# Patient Record
Sex: FEMALE | Race: WHITE | NOT HISPANIC OR LATINO | Employment: FULL TIME | URBAN - METROPOLITAN AREA
[De-identification: names, ages, dates, MRNs, and addresses within clinical notes are randomized per-mention and may not be internally consistent; named-entity substitution may affect disease eponyms.]

---

## 2020-08-21 ENCOUNTER — OFFICE VISIT (OUTPATIENT)
Dept: URGENT CARE | Facility: CLINIC | Age: 40
End: 2020-08-21
Payer: COMMERCIAL

## 2020-08-21 VITALS
SYSTOLIC BLOOD PRESSURE: 122 MMHG | WEIGHT: 138 LBS | RESPIRATION RATE: 12 BRPM | TEMPERATURE: 98.4 F | HEIGHT: 66 IN | OXYGEN SATURATION: 98 % | BODY MASS INDEX: 22.18 KG/M2 | HEART RATE: 98 BPM | DIASTOLIC BLOOD PRESSURE: 74 MMHG

## 2020-08-21 DIAGNOSIS — S61.012A LACERATION OF LEFT THUMB WITHOUT FOREIGN BODY WITHOUT DAMAGE TO NAIL, INITIAL ENCOUNTER: ICD-10-CM

## 2020-08-21 PROCEDURE — 12002 RPR S/N/AX/GEN/TRNK2.6-7.5CM: CPT | Mod: FA | Performed by: PHYSICIAN ASSISTANT

## 2020-08-21 RX ORDER — ESTRADIOL VALERATE 40 MG/ML
INJECTION INTRAMUSCULAR
Status: ON HOLD | COMMUNITY
Start: 2020-08-03 | End: 2021-03-16

## 2020-08-21 RX ORDER — PROGESTERONE 90 MG/1.125G
GEL VAGINAL
Status: ON HOLD | COMMUNITY
Start: 2020-06-18 | End: 2021-03-16

## 2020-08-21 RX ORDER — PROGESTERONE 100 MG/1
INSERT VAGINAL
Status: ON HOLD | COMMUNITY
Start: 2020-08-17 | End: 2021-03-16

## 2020-08-21 RX ORDER — ASPIRIN 81 MG/1
TABLET, COATED ORAL
Status: ON HOLD | COMMUNITY
Start: 2020-07-20 | End: 2021-03-16

## 2020-08-21 RX ORDER — LEUPROLIDE ACETATE 1 MG/0.2ML
KIT SUBCUTANEOUS
Status: ON HOLD | COMMUNITY
Start: 2020-05-27 | End: 2021-03-16

## 2020-08-21 RX ORDER — PROGESTERONE 50 MG/ML
INJECTION, SOLUTION INTRAMUSCULAR
Status: ON HOLD | COMMUNITY
Start: 2020-08-17 | End: 2021-03-16

## 2020-08-21 ASSESSMENT — ENCOUNTER SYMPTOMS
DIZZINESS: 0
FEVER: 0
SHORTNESS OF BREATH: 0
MUSCULOSKELETAL NEGATIVE: 1
NAUSEA: 0
EYE DISCHARGE: 0
VOMITING: 0
EYE REDNESS: 0
CHILLS: 0
ABDOMINAL PAIN: 0
ROS SKIN COMMENTS: + LACERATION
DIARRHEA: 0

## 2020-08-21 NOTE — PROGRESS NOTES
"Subjective:      Tiffanie Shaikh is a 39 y.o. female who presents with Laceration (LEFT thumb after attempting to catch a plate last night )            Laceration   The incident occurred 12 to 24 hours ago. The laceration is located on the left hand. The laceration is 3 cm in size. The pain is at a severity of 2/10. The pain is mild. The pain has been constant since onset. She reports no foreign bodies present. Her tetanus status is UTD.     Patient presents to urgent care reporting a laceration on her left thumb that occurred last night when she attempted to catch a ceramic plate that fell. She is right hand dominant. No significant pain. She denies distal numbness/tingling. Tdap is UTD (2017).    Review of Systems   Constitutional: Negative for chills and fever.   HENT: Negative for congestion.    Eyes: Negative for discharge and redness.   Respiratory: Negative for shortness of breath.    Cardiovascular: Negative for chest pain.   Gastrointestinal: Negative for abdominal pain, diarrhea, nausea and vomiting.   Genitourinary: Negative.    Musculoskeletal: Negative.    Skin: Negative for rash.        + laceration   Neurological: Negative for dizziness.        Objective:     /74   Pulse 98   Temp 36.9 °C (98.4 °F) (Temporal)   Resp 12   Ht 1.676 m (5' 6\")   Wt 62.6 kg (138 lb)   LMP 06/22/2020 (Exact Date)   SpO2 98%   Breastfeeding No   BMI 22.27 kg/m²        Physical Exam  Vitals signs and nursing note reviewed.   Constitutional:       General: She is not in acute distress.     Appearance: Normal appearance. She is well-developed. She is not diaphoretic.   HENT:      Head: Normocephalic and atraumatic.      Right Ear: External ear normal.      Left Ear: External ear normal.      Nose: Nose normal.   Eyes:      Pupils: Pupils are equal, round, and reactive to light.   Neck:      Musculoskeletal: Normal range of motion.   Cardiovascular:      Rate and Rhythm: Normal rate.   Pulmonary:      Effort: " Pulmonary effort is normal.   Musculoskeletal: Normal range of motion.        Hands:       Comments: Jagged laceration present on radial aspect of left thumb, measuring approximately 4 cm in total length. Minimal active bleeding without foreign bodies noted. Distally n/v intact.    Skin:     General: Skin is warm and dry.   Neurological:      Mental Status: She is alert and oriented to person, place, and time.   Psychiatric:         Behavior: Behavior normal.          PMH:  has no past medical history on file.  MEDS:   Current Outpatient Medications:   •  ASPIRIN LOW DOSE 81 MG EC tablet, , Disp: , Rfl:   •  estradiol valerate (DELESTROGEN) 40 MG/ML Oil, , Disp: , Rfl:   •  progesterone 50 MG/ML injection, , Disp: , Rfl:   •  ENDOMETRIN 100 MG vaginal insert, , Disp: , Rfl:   •  CRINONE 8 % Gel gel, , Disp: , Rfl:   •  Leuprolide Acetate 1 MG/0.2ML Kit, , Disp: , Rfl:   ALLERGIES: No Known Allergies  SURGHX:   Past Surgical History:   Procedure Laterality Date   • TONSILLECTOMY       SOCHX:  reports that she has never smoked. She has never used smokeless tobacco. She reports previous alcohol use. She reports that she does not use drugs.  FH: family history is not on file.       Assessment/Plan:        1. Laceration of left thumb without foreign body without damage to nail, initial encounter    Procedure: Laceration Repair  -Risks including bleeding, nerve damage, infection, and poor cosmetic outcome discussed. Benefits and alternatives discussed.   -Clean technique with sterile instruments and suture used  -Local anesthesia with 2% lidocaine without epi  -Closed with 7 # 4-0 Nylon interrupted sutures with good wound approximation  -Polysporin and dressing placed  -Patient tolerated well    Wound care discussed at length. RTC in 7-10 days for suture removal, or sooner if signs of infection develop. The patient demonstrated a good understanding and agreed with the treatment plan.

## 2020-08-29 ENCOUNTER — OFFICE VISIT (OUTPATIENT)
Dept: URGENT CARE | Facility: CLINIC | Age: 40
End: 2020-08-29
Payer: COMMERCIAL

## 2020-08-29 VITALS
OXYGEN SATURATION: 97 % | HEIGHT: 66 IN | DIASTOLIC BLOOD PRESSURE: 82 MMHG | WEIGHT: 137.2 LBS | SYSTOLIC BLOOD PRESSURE: 125 MMHG | HEART RATE: 77 BPM | BODY MASS INDEX: 22.05 KG/M2 | TEMPERATURE: 98 F

## 2020-08-29 DIAGNOSIS — Z48.02 VISIT FOR SUTURE REMOVAL: ICD-10-CM

## 2020-08-29 DIAGNOSIS — S61.012D LACERATION OF LEFT THUMB, SUBSEQUENT ENCOUNTER: ICD-10-CM

## 2020-08-29 PROCEDURE — 99212 OFFICE O/P EST SF 10 MIN: CPT | Performed by: FAMILY MEDICINE

## 2020-08-29 NOTE — PROGRESS NOTES
HPI:   Presents for suture removal 8 days post procedure. L thumb aceration without pain, discharge or redness.     ROS:   no fever, no sensory loss, no weakness    Exam:   Gen: WDWN in no distress  Wound: well healing laceration. 7 interrupted sutures removed without difficulty. No evidence of dehiscence or infection.  Neuro: sensory/motor intact     A/P   Laceration  Suture Removal  F/U prn

## 2020-11-18 LAB
HBV SURFACE AG SERPL QL IA: NEGATIVE
HIV 1+2 AB+HIV1 P24 AG SERPL QL IA: NORMAL
RUBV IGG SERPL IA-ACNC: NORMAL

## 2020-12-18 LAB — TREPONEMA PALLIDUM IGG+IGM AB [PRESENCE] IN SERUM OR PLASMA BY IMMUNOASSAY: NORMAL

## 2021-01-25 LAB
ABO GROUP BLD: NORMAL
RH BLD: NORMAL

## 2021-02-25 LAB
STREP GP B DNA PCR: POSITIVE

## 2021-03-12 ENCOUNTER — HOSPITAL ENCOUNTER (OUTPATIENT)
Dept: OBGYN | Facility: MEDICAL CENTER | Age: 41
End: 2021-03-12
Attending: OBSTETRICS & GYNECOLOGY
Payer: COMMERCIAL

## 2021-03-12 LAB
SARS-COV-2 RNA RESP QL NAA+PROBE: NOTDETECTED
SPECIMEN SOURCE: NORMAL

## 2021-03-12 PROCEDURE — U0005 INFEC AGEN DETEC AMPLI PROBE: HCPCS

## 2021-03-12 PROCEDURE — U0003 INFECTIOUS AGENT DETECTION BY NUCLEIC ACID (DNA OR RNA); SEVERE ACUTE RESPIRATORY SYNDROME CORONAVIRUS 2 (SARS-COV-2) (CORONAVIRUS DISEASE [COVID-19]), AMPLIFIED PROBE TECHNIQUE, MAKING USE OF HIGH THROUGHPUT TECHNOLOGIES AS DESCRIBED BY CMS-2020-01-R: HCPCS

## 2021-03-12 NOTE — PROGRESS NOTES
Pt here for covid screen; test collected; tolerated well; given self isolating discharge instructions, verbalizes understanding; discharged to home.  
58.1

## 2021-03-15 ENCOUNTER — APPOINTMENT (OUTPATIENT)
Dept: OBGYN | Facility: MEDICAL CENTER | Age: 41
End: 2021-03-15
Attending: OBSTETRICS & GYNECOLOGY
Payer: COMMERCIAL

## 2021-03-15 ENCOUNTER — ANESTHESIA (OUTPATIENT)
Dept: ANESTHESIOLOGY | Facility: MEDICAL CENTER | Age: 41
End: 2021-03-15
Payer: COMMERCIAL

## 2021-03-15 ENCOUNTER — ANESTHESIA EVENT (OUTPATIENT)
Dept: ANESTHESIOLOGY | Facility: MEDICAL CENTER | Age: 41
End: 2021-03-15
Payer: COMMERCIAL

## 2021-03-15 ENCOUNTER — HOSPITAL ENCOUNTER (INPATIENT)
Facility: MEDICAL CENTER | Age: 41
LOS: 2 days | End: 2021-03-17
Attending: OBSTETRICS & GYNECOLOGY | Admitting: OBSTETRICS & GYNECOLOGY
Payer: COMMERCIAL

## 2021-03-15 LAB
BASOPHILS # BLD AUTO: 0.3 % (ref 0–1.8)
BASOPHILS # BLD: 0.03 K/UL (ref 0–0.12)
EOSINOPHIL # BLD AUTO: 0.07 K/UL (ref 0–0.51)
EOSINOPHIL NFR BLD: 0.8 % (ref 0–6.9)
ERYTHROCYTE [DISTWIDTH] IN BLOOD BY AUTOMATED COUNT: 48.8 FL (ref 35.9–50)
GLUCOSE BLD-MCNC: 69 MG/DL (ref 65–99)
GLUCOSE BLD-MCNC: 79 MG/DL (ref 65–99)
GLUCOSE BLD-MCNC: 99 MG/DL (ref 65–99)
HCT VFR BLD AUTO: 40.7 % (ref 37–47)
HGB BLD-MCNC: 13.9 G/DL (ref 12–16)
HOLDING TUBE BB 8507: NORMAL
IMM GRANULOCYTES # BLD AUTO: 0.04 K/UL (ref 0–0.11)
IMM GRANULOCYTES NFR BLD AUTO: 0.4 % (ref 0–0.9)
LYMPHOCYTES # BLD AUTO: 2.88 K/UL (ref 1–4.8)
LYMPHOCYTES NFR BLD: 31.2 % (ref 22–41)
MCH RBC QN AUTO: 33.8 PG (ref 27–33)
MCHC RBC AUTO-ENTMCNC: 34.2 G/DL (ref 33.6–35)
MCV RBC AUTO: 99 FL (ref 81.4–97.8)
MONOCYTES # BLD AUTO: 0.53 K/UL (ref 0–0.85)
MONOCYTES NFR BLD AUTO: 5.7 % (ref 0–13.4)
NEUTROPHILS # BLD AUTO: 5.69 K/UL (ref 2–7.15)
NEUTROPHILS NFR BLD: 61.6 % (ref 44–72)
NRBC # BLD AUTO: 0 K/UL
NRBC BLD-RTO: 0 /100 WBC
PLATELET # BLD AUTO: 145 K/UL (ref 164–446)
PMV BLD AUTO: 12.3 FL (ref 9–12.9)
RBC # BLD AUTO: 4.11 M/UL (ref 4.2–5.4)
WBC # BLD AUTO: 9.2 K/UL (ref 4.8–10.8)

## 2021-03-15 PROCEDURE — 700111 HCHG RX REV CODE 636 W/ 250 OVERRIDE (IP): Performed by: OBSTETRICS & GYNECOLOGY

## 2021-03-15 PROCEDURE — 770002 HCHG ROOM/CARE - OB PRIVATE (112)

## 2021-03-15 PROCEDURE — 700111 HCHG RX REV CODE 636 W/ 250 OVERRIDE (IP)

## 2021-03-15 PROCEDURE — 85025 COMPLETE CBC W/AUTO DIFF WBC: CPT

## 2021-03-15 PROCEDURE — 59409 OBSTETRICAL CARE: CPT

## 2021-03-15 PROCEDURE — 10907ZC DRAINAGE OF AMNIOTIC FLUID, THERAPEUTIC FROM PRODUCTS OF CONCEPTION, VIA NATURAL OR ARTIFICIAL OPENING: ICD-10-PCS | Performed by: OBSTETRICS & GYNECOLOGY

## 2021-03-15 PROCEDURE — 700105 HCHG RX REV CODE 258: Performed by: OBSTETRICS & GYNECOLOGY

## 2021-03-15 PROCEDURE — 82962 GLUCOSE BLOOD TEST: CPT | Mod: 91

## 2021-03-15 PROCEDURE — 36415 COLL VENOUS BLD VENIPUNCTURE: CPT

## 2021-03-15 PROCEDURE — 700101 HCHG RX REV CODE 250: Performed by: ANESTHESIOLOGY

## 2021-03-15 PROCEDURE — 303615 HCHG EPIDURAL/SPINAL ANESTHESIA FOR LABOR

## 2021-03-15 PROCEDURE — 3E033VJ INTRODUCTION OF OTHER HORMONE INTO PERIPHERAL VEIN, PERCUTANEOUS APPROACH: ICD-10-PCS | Performed by: OBSTETRICS & GYNECOLOGY

## 2021-03-15 PROCEDURE — 304965 HCHG RECOVERY SERVICES

## 2021-03-15 RX ORDER — CARBOPROST TROMETHAMINE 250 UG/ML
250 INJECTION, SOLUTION INTRAMUSCULAR
Status: DISCONTINUED | OUTPATIENT
Start: 2021-03-15 | End: 2021-03-17 | Stop reason: HOSPADM

## 2021-03-15 RX ORDER — SODIUM CHLORIDE, SODIUM LACTATE, POTASSIUM CHLORIDE, AND CALCIUM CHLORIDE .6; .31; .03; .02 G/100ML; G/100ML; G/100ML; G/100ML
1000 INJECTION, SOLUTION INTRAVENOUS
Status: DISCONTINUED | OUTPATIENT
Start: 2021-03-15 | End: 2021-03-15 | Stop reason: HOSPADM

## 2021-03-15 RX ORDER — ROPIVACAINE HYDROCHLORIDE 2 MG/ML
INJECTION, SOLUTION EPIDURAL; INFILTRATION; PERINEURAL CONTINUOUS
Status: DISCONTINUED | OUTPATIENT
Start: 2021-03-15 | End: 2021-03-17 | Stop reason: HOSPADM

## 2021-03-15 RX ORDER — IBUPROFEN 600 MG/1
600 TABLET ORAL EVERY 6 HOURS PRN
Status: DISCONTINUED | OUTPATIENT
Start: 2021-03-15 | End: 2021-03-17 | Stop reason: HOSPADM

## 2021-03-15 RX ORDER — ONDANSETRON 2 MG/ML
4 INJECTION INTRAMUSCULAR; INTRAVENOUS EVERY 6 HOURS PRN
Status: DISCONTINUED | OUTPATIENT
Start: 2021-03-15 | End: 2021-03-17 | Stop reason: HOSPADM

## 2021-03-15 RX ORDER — DOCUSATE SODIUM 100 MG/1
100 CAPSULE, LIQUID FILLED ORAL 2 TIMES DAILY PRN
Status: DISCONTINUED | OUTPATIENT
Start: 2021-03-15 | End: 2021-03-17 | Stop reason: HOSPADM

## 2021-03-15 RX ORDER — SODIUM CHLORIDE, SODIUM LACTATE, POTASSIUM CHLORIDE, CALCIUM CHLORIDE 600; 310; 30; 20 MG/100ML; MG/100ML; MG/100ML; MG/100ML
INJECTION, SOLUTION INTRAVENOUS PRN
Status: DISCONTINUED | OUTPATIENT
Start: 2021-03-15 | End: 2021-03-17 | Stop reason: HOSPADM

## 2021-03-15 RX ORDER — ONDANSETRON 4 MG/1
4 TABLET, ORALLY DISINTEGRATING ORAL EVERY 6 HOURS PRN
Status: DISCONTINUED | OUTPATIENT
Start: 2021-03-15 | End: 2021-03-17 | Stop reason: HOSPADM

## 2021-03-15 RX ORDER — OXYCODONE HYDROCHLORIDE AND ACETAMINOPHEN 5; 325 MG/1; MG/1
1 TABLET ORAL EVERY 4 HOURS PRN
Status: DISCONTINUED | OUTPATIENT
Start: 2021-03-15 | End: 2021-03-17 | Stop reason: HOSPADM

## 2021-03-15 RX ORDER — MISOPROSTOL 200 UG/1
800 TABLET ORAL
Status: DISCONTINUED | OUTPATIENT
Start: 2021-03-15 | End: 2021-03-15 | Stop reason: HOSPADM

## 2021-03-15 RX ORDER — ROPIVACAINE HYDROCHLORIDE 2 MG/ML
INJECTION, SOLUTION EPIDURAL; INFILTRATION; PERINEURAL
Status: COMPLETED
Start: 2021-03-15 | End: 2021-03-15

## 2021-03-15 RX ORDER — MISOPROSTOL 200 UG/1
600 TABLET ORAL
Status: DISCONTINUED | OUTPATIENT
Start: 2021-03-15 | End: 2021-03-17 | Stop reason: HOSPADM

## 2021-03-15 RX ORDER — SODIUM CHLORIDE, SODIUM LACTATE, POTASSIUM CHLORIDE, CALCIUM CHLORIDE 600; 310; 30; 20 MG/100ML; MG/100ML; MG/100ML; MG/100ML
INJECTION, SOLUTION INTRAVENOUS CONTINUOUS
Status: ACTIVE | OUTPATIENT
Start: 2021-03-15 | End: 2021-03-15

## 2021-03-15 RX ORDER — LIDOCAINE HYDROCHLORIDE AND EPINEPHRINE 15; 5 MG/ML; UG/ML
INJECTION, SOLUTION EPIDURAL
Status: COMPLETED | OUTPATIENT
Start: 2021-03-15 | End: 2021-03-15

## 2021-03-15 RX ORDER — HYDROCODONE BITARTRATE AND ACETAMINOPHEN 10; 325 MG/1; MG/1
1 TABLET ORAL EVERY 4 HOURS PRN
Status: DISCONTINUED | OUTPATIENT
Start: 2021-03-15 | End: 2021-03-17 | Stop reason: HOSPADM

## 2021-03-15 RX ORDER — SODIUM CHLORIDE, SODIUM LACTATE, POTASSIUM CHLORIDE, AND CALCIUM CHLORIDE .6; .31; .03; .02 G/100ML; G/100ML; G/100ML; G/100ML
250 INJECTION, SOLUTION INTRAVENOUS PRN
Status: DISCONTINUED | OUTPATIENT
Start: 2021-03-15 | End: 2021-03-15 | Stop reason: HOSPADM

## 2021-03-15 RX ADMIN — SODIUM CHLORIDE 5 MILLION UNITS: 900 INJECTION INTRAVENOUS at 07:48

## 2021-03-15 RX ADMIN — ROPIVACAINE HYDROCHLORIDE 200 MG: 2 INJECTION, SOLUTION EPIDURAL; INFILTRATION; PERINEURAL at 14:50

## 2021-03-15 RX ADMIN — SODIUM CHLORIDE, POTASSIUM CHLORIDE, SODIUM LACTATE AND CALCIUM CHLORIDE: 600; 310; 30; 20 INJECTION, SOLUTION INTRAVENOUS at 07:31

## 2021-03-15 RX ADMIN — LIDOCAINE HYDROCHLORIDE,EPINEPHRINE BITARTRATE 3 ML: 15; .005 INJECTION, SOLUTION EPIDURAL; INFILTRATION; INTRACAUDAL; PERINEURAL at 14:55

## 2021-03-15 RX ADMIN — SODIUM CHLORIDE, POTASSIUM CHLORIDE, SODIUM LACTATE AND CALCIUM CHLORIDE: 600; 310; 30; 20 INJECTION, SOLUTION INTRAVENOUS at 14:19

## 2021-03-15 RX ADMIN — SODIUM CHLORIDE 2.5 MILLION UNITS: 9 INJECTION, SOLUTION INTRAVENOUS at 12:06

## 2021-03-15 RX ADMIN — SODIUM CHLORIDE 2.5 MILLION UNITS: 9 INJECTION, SOLUTION INTRAVENOUS at 15:59

## 2021-03-15 RX ADMIN — OXYTOCIN 41.67 MILLI-UNITS/MIN: 10 INJECTION, SOLUTION INTRAMUSCULAR; INTRAVENOUS at 18:53

## 2021-03-15 RX ADMIN — OXYTOCIN 2 MILLI-UNITS/MIN: 10 INJECTION, SOLUTION INTRAMUSCULAR; INTRAVENOUS at 07:36

## 2021-03-15 RX ADMIN — ROPIVACAINE HYDROCHLORIDE 200 MG: 2 INJECTION, SOLUTION EPIDURAL; INFILTRATION at 14:50

## 2021-03-15 ASSESSMENT — LIFESTYLE VARIABLES
TOTAL SCORE: 0
CONSUMPTION TOTAL: NEGATIVE
EVER FELT BAD OR GUILTY ABOUT YOUR DRINKING: NO
EVER_SMOKED: NEVER
HOW MANY TIMES IN THE PAST YEAR HAVE YOU HAD 5 OR MORE DRINKS IN A DAY: 0
ALCOHOL_USE: NO
TOTAL SCORE: 0
HAVE YOU EVER FELT YOU SHOULD CUT DOWN ON YOUR DRINKING: NO
TOTAL SCORE: 0
EVER HAD A DRINK FIRST THING IN THE MORNING TO STEADY YOUR NERVES TO GET RID OF A HANGOVER: NO
DOES PATIENT WANT TO STOP DRINKING: NO
ON A TYPICAL DAY WHEN YOU DRINK ALCOHOL HOW MANY DRINKS DO YOU HAVE: 0
HAVE PEOPLE ANNOYED YOU BY CRITICIZING YOUR DRINKING: NO
AVERAGE NUMBER OF DAYS PER WEEK YOU HAVE A DRINK CONTAINING ALCOHOL: 0

## 2021-03-15 ASSESSMENT — PAIN SCALES - GENERAL: PAIN_LEVEL: 2

## 2021-03-15 NOTE — ANESTHESIA PREPROCEDURE EVALUATION
Relevant Problems   No relevant active problems       Physical Exam    Airway   Mallampati: I  TM distance: >3 FB  Neck ROM: full       Cardiovascular   Rhythm: regular  Rate: normal     Dental - normal exam           Pulmonary   Breath sounds clear to auscultation     Abdominal - normal exam     Neurological - normal exam                 Anesthesia Plan    ASA 2       Plan - epidural   Neuraxial block will be labor analgesia          Plan Factors:   Patient was not previously instructed to abstain from smoking on day of procedure.  Patient did not smoke on day of procedure.                Informed Consent:    Anesthetic plan and risks discussed with patient.    Use of blood products discussed with: patient whom consented to blood products.

## 2021-03-15 NOTE — PROGRESS NOTES
Labor Progress Note    Tiffanie Shaikh   38w1d      Subjective:  Pt with urge to push.  Uterine contractions:yes  Pain: Yes. Severity: moderate    Objective:   Vitals:    03/15/21 1548 03/15/21 1554 03/15/21 1558 03/15/21 1604   BP: (!) 91/53 106/55 103/54 115/59   Pulse: 95 88 88 82   Resp:       Temp:       TempSrc:       SpO2:       Weight:       Height:         Fetal heart variability: 130's category 1  [place]: q 2  SVE: c/c/+2    Membranes ruptured: .yes    Meds:   Epidural : .yes  Pitocin: .yes, 14  Penicillin G, sp 3rd dose    Labs:  Recent Results (from the past 24 hour(s))   Hold Blood Bank Specimen (Not Tested)    Collection Time: 03/15/21  6:39 AM   Result Value Ref Range    Holding Tube - Bb DONE    CBC WITH DIFFERENTIAL    Collection Time: 03/15/21  6:39 AM   Result Value Ref Range    WBC 9.2 4.8 - 10.8 K/uL    RBC 4.11 (L) 4.20 - 5.40 M/uL    Hemoglobin 13.9 12.0 - 16.0 g/dL    Hematocrit 40.7 37.0 - 47.0 %    MCV 99.0 (H) 81.4 - 97.8 fL    MCH 33.8 (H) 27.0 - 33.0 pg    MCHC 34.2 33.6 - 35.0 g/dL    RDW 48.8 35.9 - 50.0 fL    Platelet Count 145 (L) 164 - 446 K/uL    MPV 12.3 9.0 - 12.9 fL    Neutrophils-Polys 61.60 44.00 - 72.00 %    Lymphocytes 31.20 22.00 - 41.00 %    Monocytes 5.70 0.00 - 13.40 %    Eosinophils 0.80 0.00 - 6.90 %    Basophils 0.30 0.00 - 1.80 %    Immature Granulocytes 0.40 0.00 - 0.90 %    Nucleated RBC 0.00 /100 WBC    Neutrophils (Absolute) 5.69 2.00 - 7.15 K/uL    Lymphs (Absolute) 2.88 1.00 - 4.80 K/uL    Monos (Absolute) 0.53 0.00 - 0.85 K/uL    Eos (Absolute) 0.07 0.00 - 0.51 K/uL    Baso (Absolute) 0.03 0.00 - 0.12 K/uL    Immature Granulocytes (abs) 0.04 0.00 - 0.11 K/uL    NRBC (Absolute) 0.00 K/uL   ACCU-CHEK GLUCOSE    Collection Time: 03/15/21  7:47 AM   Result Value Ref Range    Glucose - Accu-Ck 99 65 - 99 mg/dL   ACCU-CHEK GLUCOSE    Collection Time: 03/15/21 11:55 AM   Result Value Ref Range    Glucose - Accu-Ck 69 65 - 99 mg/dL       Assessment:    38w1d/complete-push, expt .   IVF- pt's own eggs and spouse sperm  Fetal status-reassuring  A2DM- stable blood sugars.   GBBS positive- s/p 3rd dose of Penicillin G, pt afebrile        Agatha Suggs M.D.

## 2021-03-15 NOTE — PROGRESS NOTES
Report received, assessment done. Pt is comfortable,denies H/A visual changes and epigastric pain. SVE done and Dr Suggs was called, report given and order received.  0800 FS is 99. penG started and pit started at 2 milliunit.  1200 pt is still comfortable, SVE done 3-4 70% -2, FS is 69 and pt was asked to have a snack.  1300 Dr Suggs in the room, SVE done 3-4 70% -2 AROM, clear at 1308.  1414 pt wants epidural, hydrating for epidural, consents are signed.  1440 Dr Diaz in,spoke to pt and time out done.epidural cath was placed.  1447 test dose given.pt tolerated well.  1502 pt is still uncomfortable, Dr Diaz was called back in.  1505 Dr Diaz gave pt a bolus on her right side.  1519 DR Diaz is back in the room and epidural was pulled to be replaced.  1526 epidural cath was replaced and test dose given, tolerated well.  1544 perera was inserted.  1600 FS was 79.  1630 SVE complete +2 DR Suggs was called,setting up for delivery.  1645 Joshua in, pushing with pt.  1724 Male viable baby delivered by Dr Suggs  with 8-9 apgars.

## 2021-03-15 NOTE — ANESTHESIA PROCEDURE NOTES
Epidural Block    Date/Time: 3/15/2021 2:55 PM  Performed by: Dane Diaz M.D.  Authorized by: Dane Diaz M.D.     Start Time:  3/15/2021 2:55 PM  End Time:  3/15/2021 2:55 PM  Reason for Block: labor analgesia    patient identified, IV checked, site marked, risks and benefits discussed, surgical consent, monitors and equipment checked, pre-op evaluation and timeout performed    Patient Position:  Sitting  Prep: ChloraPrep    Monitoring:  Blood pressure and continuous pulse oximetry  Approach:  Midline  Location:  L3-L4  Injection Technique:  CHINYERE air  Strength:  1%  Dose:  3ml  Needle Type:  Tuohy  Needle Gauge:  17 G  Needle Length:  3.5 in  Loss of resistance::  4  Catheter Size:  19 G  Catheter at Skin Depth:  10  Test Dose Result:  Negative

## 2021-03-15 NOTE — PROGRESS NOTES
"Labor Progress Note    Tiffanie Willsjam   38w1d      Subjective:  Pt here for IOL secondary to A2DM. Pt with minimal contractions.   Uterine contractions:yes  Pain: Yes. Severity: mild    Objective:   Vitals:    03/15/21 0642 03/15/21 0651 03/15/21 0711 03/15/21 0752   BP:   143/83 124/78   Pulse:   88 85   Temp: 36.6 °C (97.9 °F)   36.3 °C (97.4 °F)   TempSrc: Temporal   Temporal   Weight:  71.7 kg (158 lb)     Height:  1.676 m (5' 6\")       Fetal heart variability: 140's category 1  [place]; occ  SVE: 2/60/-1      Membranes ruptured: .no    Meds:     Pitocin: .yes, 2 mu    Labs:  Recent Results (from the past 24 hour(s))   Hold Blood Bank Specimen (Not Tested)    Collection Time: 03/15/21  6:39 AM   Result Value Ref Range    Holding Tube - Bb DONE    CBC WITH DIFFERENTIAL    Collection Time: 03/15/21  6:39 AM   Result Value Ref Range    WBC 9.2 4.8 - 10.8 K/uL    RBC 4.11 (L) 4.20 - 5.40 M/uL    Hemoglobin 13.9 12.0 - 16.0 g/dL    Hematocrit 40.7 37.0 - 47.0 %    MCV 99.0 (H) 81.4 - 97.8 fL    MCH 33.8 (H) 27.0 - 33.0 pg    MCHC 34.2 33.6 - 35.0 g/dL    RDW 48.8 35.9 - 50.0 fL    Platelet Count 145 (L) 164 - 446 K/uL    MPV 12.3 9.0 - 12.9 fL    Neutrophils-Polys 61.60 44.00 - 72.00 %    Lymphocytes 31.20 22.00 - 41.00 %    Monocytes 5.70 0.00 - 13.40 %    Eosinophils 0.80 0.00 - 6.90 %    Basophils 0.30 0.00 - 1.80 %    Immature Granulocytes 0.40 0.00 - 0.90 %    Nucleated RBC 0.00 /100 WBC    Neutrophils (Absolute) 5.69 2.00 - 7.15 K/uL    Lymphs (Absolute) 2.88 1.00 - 4.80 K/uL    Monos (Absolute) 0.53 0.00 - 0.85 K/uL    Eos (Absolute) 0.07 0.00 - 0.51 K/uL    Baso (Absolute) 0.03 0.00 - 0.12 K/uL    Immature Granulocytes (abs) 0.04 0.00 - 0.11 K/uL    NRBC (Absolute) 0.00 K/uL       Assessment:   38w1d/A2DM- pt here for IOL, pt on pitocin. Expt .  IVF- pt's own eggs and spouse sperm  Fetal status-reassuring  A2DM- stable      Agatha Suggs M.D.          "

## 2021-03-15 NOTE — PROGRESS NOTES
Labor Progress Note    Tiffanie Shaikh   38w1d      Subjective:  Pt feeling contractions, but not painful.  Uterine contractions:yes  Pain: Yes. Severity: mild    Objective:   Vitals:    03/15/21 0711 03/15/21 0752 03/15/21 0938 03/15/21 1213   BP: 143/83 124/78 127/79 131/79   Pulse: 88 85 88 81   Temp:  36.3 °C (97.4 °F)  36.3 °C (97.3 °F)   TempSrc:  Temporal  Temporal   Weight:       Height:         Fetal heart variability: 140's category1  [place]: q 1-3  SVE: 3-4/70/-2, arom, clear     Membranes ruptured: .yes, clear    Meds:   Epidural : .no  Pitocin: .yes, 12  Penicillin G, s/p 2nd dose  Labs:  Recent Results (from the past 24 hour(s))   Hold Blood Bank Specimen (Not Tested)    Collection Time: 03/15/21  6:39 AM   Result Value Ref Range    Holding Tube - Bb DONE    CBC WITH DIFFERENTIAL    Collection Time: 03/15/21  6:39 AM   Result Value Ref Range    WBC 9.2 4.8 - 10.8 K/uL    RBC 4.11 (L) 4.20 - 5.40 M/uL    Hemoglobin 13.9 12.0 - 16.0 g/dL    Hematocrit 40.7 37.0 - 47.0 %    MCV 99.0 (H) 81.4 - 97.8 fL    MCH 33.8 (H) 27.0 - 33.0 pg    MCHC 34.2 33.6 - 35.0 g/dL    RDW 48.8 35.9 - 50.0 fL    Platelet Count 145 (L) 164 - 446 K/uL    MPV 12.3 9.0 - 12.9 fL    Neutrophils-Polys 61.60 44.00 - 72.00 %    Lymphocytes 31.20 22.00 - 41.00 %    Monocytes 5.70 0.00 - 13.40 %    Eosinophils 0.80 0.00 - 6.90 %    Basophils 0.30 0.00 - 1.80 %    Immature Granulocytes 0.40 0.00 - 0.90 %    Nucleated RBC 0.00 /100 WBC    Neutrophils (Absolute) 5.69 2.00 - 7.15 K/uL    Lymphs (Absolute) 2.88 1.00 - 4.80 K/uL    Monos (Absolute) 0.53 0.00 - 0.85 K/uL    Eos (Absolute) 0.07 0.00 - 0.51 K/uL    Baso (Absolute) 0.03 0.00 - 0.12 K/uL    Immature Granulocytes (abs) 0.04 0.00 - 0.11 K/uL    NRBC (Absolute) 0.00 K/uL   ACCU-CHEK GLUCOSE    Collection Time: 03/15/21  7:47 AM   Result Value Ref Range    Glucose - Accu-Ck 99 65 - 99 mg/dL   ACCU-CHEK GLUCOSE    Collection Time: 03/15/21 11:55 AM   Result Value Ref Range    Glucose -  Accu-Ck 69 65 - 99 mg/dL       Assessment:   38w1d/early labor- pt on pitocin, s/p arom. CPM, expt .  IVF- pt's own eggs and spouse sperm  Fetal status-reassuring  A2DM- stable blood sugars.   GBBS positive- s/p 2nd dose of Penicillin G, pt afebrile        Agatha Suggs M.D.

## 2021-03-15 NOTE — PROGRESS NOTES
0630 IV started, labs drawn, tolerated well. Oriented to room, call light, emergency light. Monitors applied x2, Educated on pain management options, infection prevention and hand hygiene, all questions answered, understanding verbalized, admission profile complete.      0700 - report given to Veena RESENDIZ.

## 2021-03-16 LAB
ERYTHROCYTE [DISTWIDTH] IN BLOOD BY AUTOMATED COUNT: 47.4 FL (ref 35.9–50)
HCT VFR BLD AUTO: 38 % (ref 37–47)
HGB BLD-MCNC: 12.9 G/DL (ref 12–16)
MCH RBC QN AUTO: 33.3 PG (ref 27–33)
MCHC RBC AUTO-ENTMCNC: 33.9 G/DL (ref 33.6–35)
MCV RBC AUTO: 98.2 FL (ref 81.4–97.8)
PLATELET # BLD AUTO: 124 K/UL (ref 164–446)
PMV BLD AUTO: 12.6 FL (ref 9–12.9)
RBC # BLD AUTO: 3.87 M/UL (ref 4.2–5.4)
WBC # BLD AUTO: 12.5 K/UL (ref 4.8–10.8)

## 2021-03-16 PROCEDURE — 85027 COMPLETE CBC AUTOMATED: CPT

## 2021-03-16 PROCEDURE — 36415 COLL VENOUS BLD VENIPUNCTURE: CPT

## 2021-03-16 PROCEDURE — 770002 HCHG ROOM/CARE - OB PRIVATE (112)

## 2021-03-16 ASSESSMENT — EDINBURGH POSTNATAL DEPRESSION SCALE (EPDS)
THE THOUGHT OF HARMING MYSELF HAS OCCURRED TO ME: NEVER
I HAVE BEEN ANXIOUS OR WORRIED FOR NO GOOD REASON: YES, SOMETIMES
I HAVE BEEN SO UNHAPPY THAT I HAVE BEEN CRYING: NO, NEVER
I HAVE LOOKED FORWARD WITH ENJOYMENT TO THINGS: AS MUCH AS I EVER DID
I HAVE BEEN ABLE TO LAUGH AND SEE THE FUNNY SIDE OF THINGS: AS MUCH AS I ALWAYS COULD
I HAVE BLAMED MYSELF UNNECESSARILY WHEN THINGS WENT WRONG: NOT VERY OFTEN
I HAVE BEEN SO UNHAPPY THAT I HAVE HAD DIFFICULTY SLEEPING: NOT AT ALL
I HAVE FELT SCARED OR PANICKY FOR NO GOOD REASON: NO, NOT AT ALL
THINGS HAVE BEEN GETTING ON TOP OF ME: NO, I HAVE BEEN COPING AS WELL AS EVER
I HAVE FELT SAD OR MISERABLE: NO, NOT AT ALL

## 2021-03-16 NOTE — L&D DELIVERY NOTE
Delivery note    , male with apgars 8 and 9 with a loose nuchal cord x 1. Placenta intact, 3 vc, no lacerations. EBL: 300. Pt had 3 doses of penicillin g prior to delivery.  Mom and baby doing well.

## 2021-03-16 NOTE — PROGRESS NOTES
Assume care form labor and delivery. Oriente pt to room, call light, emergency light, tv, and bed remote. Assessment complete. Fundus firm, lochia light. Plan of care reviewed. Pt verbalized understanding. Denies pain at this time, educated to call if pt has pain.

## 2021-03-16 NOTE — L&D DELIVERY NOTE
DATE OF SERVICE:  03/15/2021     ADMITTING DIAGNOSES:    1.  Intrauterine pregnancy at 38 weeks and 1 day.  2.  A2 diabetes, on insulin.  3.  Advanced maternal age.  4.  IVF pregnancy with the patient's and 's own gametes.  5.  GBS positive.     PROCEDURES:  1.  Induction of labor with Pitocin.  2.  Penicillin G per protocol status post 3 doses prior to delivery.  3.  Epidural.  4.  Serial blood sugars, stable.  5.  Normal spontaneous vaginal delivery of a vigorous male with Apgars 8 and   9.     DESCRIPTION OF PROCEDURE:  This patient is a 40-year-old  2, para 1    female who was admitted at 38 weeks and 1 day secondary to induction   of labor.  The patient is an A2 diabetic, on insulin and therefore per [facility], the recommendation was to induce.  She was brought in   this morning. She was having irregular contractions, but was 2 cm dilated.    Therefore, she was started on Pitocin.  Fetal status was reassuring.  She was   group B strep positive and was started on penicillin G.  She was afebrile.    Fetal status remained reassuring throughout labor.  The patient had artificial   rupture of membranes at 1:08 p.m.  She had already had her second dose of   penicillin G.  The patient then got an epidural for pain control and   progressed through labor without any problems.  The patient was complete at   4:30 p.m.  At that time, she was prepped and draped in the usual sterile   fashion.  At 7:24, I assisted with a normal spontaneous vaginal delivery of a   vigorous male in ELVIA position.  The head was delivered atraumatically.  A   loose nuchal cord x1 was reduced and the rest of the infant delivered without   any problems.  Mouth and nose were bulb suctioned.  Infant placed on maternal   abdomen.  Delayed cord clamping was performed for 2 minutes and then the cord   was doubly clamped and cut by the infant's father.  Cord gases were clamped   and set aside.  The placenta was  then delivered at 5:33 p.m. intact.    Three-vessel cord was noted.  Inspection of the perineum revealed that there   were no lacerations.  Uterus is firm and hemostatic with an EBL of 300.  This   was again a vigorous male with Apgars 8 and 9.  .  Infant's weight is   pending.  The patient had a total of three doses of penicillin G prior to   delivery.        ______________________________  MD HILLARY SHERWOOD/HOLDEN/SHUBHAM    DD:  03/15/2021 17:46  DT:  03/15/2021 18:36    Job#:  [id number]

## 2021-03-16 NOTE — DISCHARGE INSTRUCTIONS
PATIENT DISCHARGE EDUCATION INSTRUCTION SHEET  REASONS TO CALL YOUR PEDIATRICIAN  · Projectile or forceful vomiting for more than one feeding  · Unusual rash lasting more than 24 hours  · Very sleepy, difficult to wake up  · Bright yellow or pumpkin colored skin with extreme sleepiness  · Temperature below 97.6 or above 100.4 F rectally  · Feeding problems  · Breathing problems  · Excessive crying with no known cause  · If cord starts to become red, swollen, develops a smell or discharge  · No wet diaper or stool in a 24 hour time period     REASONS TO CALL YOUR OBSTETRICIAN  · Persistent fever, shaking, chills (Temperature higher than 100.4) may indicate you have an infection  · Heavy bleeding: soaking more than 1 pad per hour; Passing clots an egg-sized clot or bigger may mean you have an postpartum hemorrhage  · Foul odor from vagina or bad smelling or discolored discharge or blood  · Breast infection (Mastitis symptoms); breast pain, chills, fever, redness or red streaks, may feel flu like symptoms  · Urinary pain, burning or frequency  · Incision that is not healing, increased redness, swelling, tenderness or pain, or any pus from episiotomy or  site may mean you have an infection  · Redness, swelling, warmth, or painful to touch in the calf area of your leg may mean you have a blood clot  · Severe or intensified depression, thoughts or feelings of wanting to hurt yourself or someone else   · Pain in chest, obstructed breathing or shortness of breath (trouble catching your breath) may mean you are having a postpartum complication. Call your provider immediately   · Headache that does not get better, even after taking medicine, a bad headache with vision changes or pain in the upper right area of your belly may mean you have high blood pressure or post birth preeclampsia. Call your provider immediately    SAFE SLEEP POSITIONING FOR YOUR BABY  The [employer] Academy [employer] Pediatric[employer] advises your baby should  be placed on his/her back for Sleeping to reduce the risk of Sudden Infant Death Syndrome (SIDS)  · Baby should sleep by themselves in a crib, portable crib or bassinet  · Baby should not share a bed with his/her parents  · Baby should be placed on his or her back to sleep, night time and at naps  · Baby should sleep on firm mattress with a tightly fitted sheet  · NO couches, waterbeds or anything soft  · Baby's sleep area should not contain any loose blankets, comforters, stuffed animals or any other soft items, (pillows, bumper pads, etc. ...)  · Baby's face should be kept uncovered at all times  · Baby should sleep in a smoke-free environment  · Do not dress baby too warmly to prevent overheating    HAND WASHING  All family and friends should wash their hands:  · Before and after holding the baby  · Before feeding the baby  · After using the restroom or changing the baby's diaper     CARE    TAKING BABY'S TEMPERATURE  · If you feel your baby may have a fever take your baby's temperature per thermometer instructions  · If taking axillary temperature place thermometer under baby's armpit and hold arm close to body  · The most precise and accurate way to take a temperature is rectally  · Turn on the digital thermometer and lubricate the tip of the thermometer with petroleum jelly.  · Lay your baby or child on his or her back, lift his or her thighs, and insert the lubricated thermometer 1/2 to 1 inch (1.3 to 2.5 centimeters) into the rectum  · Call your Pediatrician for temperature lower than 97.6 or greater than 100.4 F rectally    BATHE AND SHAMPOO BABY  · Gently wash baby with a soft cloth using warm water and mild soap - rinse well  · Do not put baby in tub bath until umbilical cord falls off and appears well-healed  · Bathing baby 2-3 times a week might be enough until your baby becomes more mobile. Bathing your baby too much can dry out his or her skin     NAIL CARE  · First recommendation is to keep  them covered to prevent facial scratching  · During the first few weeks,  nails are very soft. Doctors recommend using only a fine emery board. Don't bite or tear your baby's nails. When your baby's nails are stronger, after a few weeks, you can switch to clippers or scissors making sure not to cut too short and nip the quick   · A good time for nail care is while your baby is sleeping and moving less    CORD CARE  · Fold diaper below umbilical cord until cord falls off  · Keep umbilical cord clean and dry  · May see a small amount of crust around the base of the cord. Clean off with mild soap and water and dry             DIAPER AND DRESS BABY  · For baby girls: gently wipe from front to back. Mucous or pink tinged drainage is normal  · For uncircumcised baby boys: do NOT pull back the foreskin to clean the penis. Gently clean with wipes or warm, soapy water  · Dress baby in one more layer of clothing than you are wearing  · Use a hat to protect from sun or cold. NO ties or drawstrings    URINATION AND BOWEL MOVEMENTS  · If formula feeding or when breast milk feeding is established, your baby should wet 6-8 diapers a day and have at least 2 bowel movements a day during the first month  · Bowel movements color and type can vary from day to day    CIRCUMCISION  What to watch out for:  · Foul smelling discharge  · Fever  · Swelling   · Crusty, fluid filled sores  · Trouble urinating   · Persistent bleeding or more than a quarter size spot of blood on his diaper  · Yellow discharge lasting more than a week  · Continue with care procedures until healed or have a visit with your Pediatrician     INFANT FEEDING  · Most newborns feed 8-12 times, every 24 hours. YOU MAY NEED TO WAKE YOUR BABY UP TO FEED  · If breastfeeding, offer both breasts when your baby is showing feeding cues, such as rooting or bringing hand to mouth and sucking  · Common for  babies to feed every 1-3 hours   · Only allow baby to sleep  up to 4 hours in between feeds if baby is feeding well at each feed. Offer breast anytime baby is showing feeding cues and at least every 3 hours  · Follow up with outpatient [facility] for continued breast feeding support    FORMULA FEEDING  · Feed baby formula every 2-3 hours when your baby is showing feeding cues  · Paced bottle feeding will help baby not over eat at each feed     BOTTLE FEEDING   · Paced Bottle Feeding is a method of bottle feeding that allows the infant to be more in control of the feeding pace. This feeding method slows down the flow of milk into the nipple and the mouth, allowing the baby to eat more slowly, and take breaks. Paced feeding reduces the risk of overfeeding that may result in discomfort for the baby   · Hold baby almost upright or slightly reclined position supporting the head and neck  · Use a small nipple for slow-flowing. Slow flow nipple holes help in controlling flow   · Don't force the bottle's nipple into your baby's mouth. Tickle babies lip so baby opens their mouth  · Insert nipple and hold the bottle flat  · Let the baby suck three to four times without milk then tip the bottle just enough to fill the nipple about long-term with milk  · Let baby suck 3-5 continuous swallows, about 20-30 seconds tip the bottle down to give the baby a break  · After a few seconds, when the baby begins to suck again, tip bottle up to allow milk to flow into the nipple  · Continue to Pace feed until baby shows signs of fullness; no longer sucking after a break, turning away or pushing away the nipple   · Bottle propping is not a recommended practice for feeding  · Bottle propping is when you give a baby a bottle by leaning the bottle against a pillow, or other support, rather than holding the baby and the bottle.  · Forces your baby to keep up with the flow, even if the baby is full   · This can increase your baby's risk of choking, ear infections, and tooth decay    BOTTLE  "PREPARATION   · Never feed  formula to your baby, or use formula if the container is dented  · When using ready-to-feed, shake formula containers before opening  · If formula is in a can, clean the lid of any dust, and be sure the can opener is clean  · Formula does not need to be warmed. If you choose to feed warmed formula, do not microwave it. This can cause \"hot spots\" that could burn your baby. Instead, set the filled bottle in a bowl of warm (not boiling) water or hold the bottle under warm tap water. Sprinkle a few drops of formula on the inside of your wrist to make sure it's not too hot  · Measure and pour desired amount of water into baby bottle  · Add unpacked, level scoop(s) of powder to the bottle as directed on formula container. Return dry scoop to can  · Put the cap on the bottle and shake. Move your wrist in a twisting motion helps powder formula mix more quickly and more thoroughly  · Feed or store immediately in refrigerator  · You need to sterilize bottles, nipples, rings, etc., only before the first use    CLEANING BOTTLE  · Use hot, soapy water  · Rinse the bottles and attachments separately and clean with a bottle brush  · If your bottles are labelled  safe, you can alternatively go ahead and wash them in the    · After washing, rinse the bottle parts thoroughly in hot running water to remove any bubbles or soap residue   · Place the parts on a bottle drying rack   · Make sure the bottles are left to drain in a well-ventilated location to ensure that they dry thoroughly  CAR SEAT  For your baby's safety and to comply with [facility] Law you will need to bring a car seat to the hospital before taking your baby home. Please read your car seat instructions before your baby's discharge from the hospital.  · Make sure you place an emergency contact sticker on your baby's car seat with your baby's identifying information  · Car seat should not be placed in the front seat " of a vehicle. The car seat should be placed in the back seat in the rear-facing position.  · Car seat information is available through [employer] at [phone] and also at [email or web address]    MATERNAL CARE     WOUND CARE  Ask your physician for additional care instructions. In general:  ·  Incision:  · May shower and pat incision dry   · Keep the incision clean and dry  · There should not be any opening or pus from the incision  · Continue to walk at home 3 times a day   · Do NOT lift anything heavier than your baby (over 10 pounds)  · Encourage family to help participate in care of the  to allow rest and mom time to heal    · Episiotomy/Laceration  · May use mal-spray bottle, witch hazel pads and dermaplast spray for comfort  · Use mal-spray bottle after urinating to cleanse perineal area  · To prevent burning during urination spray mal-water bottle on labial area   · Pat perineal area dry until episiotomy/laceration is healed  · Continue to use mal-bottle until bleeding stops as needed  · If have a 2nd degree laceration or greater, a Sitz bath can offer relief from soreness, burning, and inflammation   · Sitz Bath   · Sit in 6 inches of warm water and soak laceration as needed until the laceration heals    VAGINAL CARE AND BLEEDING  · Nothing inside vagina for 6 weeks:   · No sexual intercourse, tampons or douching  · Bleeding may continue for 2-4 weeks. Amount and color may vary  · Soaking 1 pad or more in an hour for several hours is considered heavy bleeding  · Passing large egg sized blood clots can be concerning  · If you feel like you have heavy bleeding or are having increasing amount of blood clots call your Obstetrician immediately  · If you begin feeling faint upon standing, feeling sick to your stomach, have clammy skin, a really fast heartbeat, have chills, start feeling confused, dizzy, sleepy or weak, or feeling like you're going to faint call your Obstetrician  "immediately    HYPERTENSION   Preeclampsia or gestational hypertension are types of high blood pressure that only pregnant women can get. It is important for you to be aware of symptoms to seek early intervention and treatment. If you have any of these symptoms immediately call your Obstetrician    · Vision changes or blurred vision   · Severe headache or pain that is unrelieved with medication and will not go away  · Persistent pain in upper abdomen or shoulder   · Increased swelling of face, feet, or hands  · Difficulty breathing or shortness of breath at rest  · Urinating less than usual    URINATION AND BOWEL MOVEMENTS  · Eating more fiber (bran cereal, fruits, and vegetables) and drinking plenty of fluids will help to avoid constipation  · Urinary frequency and urgency after childbirth is normal  · If you experience any urinary pain, burning or frequency call your provider    BIRTH CONTROL  · It is possible to become pregnant at any time after delivery and while breastfeeding  · Plan to discuss a method of birth control with your physician at your post-delivery follow up visit    POSTPARTUM BLUES  During the first few days after birth, you may experience a sense of the \"blues\" which may include impatience, irritability or even crying. These feelings come and go quickly. However, as many as 1 in 10 women experience emotional symptoms known as postpartum depression.     POSTPARTUM DEPRESSION    May start as early as the second or third day after delivery or take several weeks or months to develop. Symptoms of \"blues\" are present, but are more intense: Crying spells; loss of appetite; feelings of hopelessness or loss of control; fear of touching the baby; over concern or no concern at all about the baby; little or no concern about your own appearance/caring for yourself; and/or inability to sleep or excessive sleeping. Contact your Obstetrician if you are experiencing any of these symptoms     PREVENTING SHAKEN " "BABY  If you are angry or stressed, PUT THE BABY IN THE CRIB, step away, take some deep breaths, and wait until you are calm to care for the baby. DO NOT SHAKE THE BABY. You are not alone, call a supporter for help.  · [facility] 24/7 crisis call line (900-141-1960) or (1-853.112.8279)  · You can also text them, text \"ANSWER\" (526829)      "

## 2021-03-16 NOTE — H&P
DATE OF ADMISSION:  03/15/2021     ADMITTING DIAGNOSES:  1.  Intrauterine pregnancy at 38 weeks and 1 day.  2.  GBS positive.  3.  IVF pregnancy with patient and 's own gametes.  3.  A2 diabetes, stable on insulin.  4.  Advanced maternal age.     HISTORY OF PRESENT ILLNESS:  This patient is a 40-year-old  3, para 1   female who is admitted at 38 weeks and 1 day for elective induction of labor   secondary to A2 diabetes.  When the patient arrived, she was having irregular   contractions.  She had a favorable cervix at 2 cm; therefore, she was started   on Pitocin.  The patient's prenatal care has been with me, complicated by   gestational diabetes, on insulin; gestational hypertension.  She also had IVF   pregnancy.  When she arrived, she was having good fetal movement, occasional   contractions, no leaking of fluid.     PAST MEDICAL HISTORY:  1.  A2 diabetes.  2.  Gestational hypertension.     PAST SURGICAL HISTORY:  Tonsillectomy.     MEDICATIONS:  1.  The patient was on prenatal vitamin.  2.  Pantoprazole 20 mg 1 p.o. every day.  3.  Aspirin 81 mg up to 37 weeks.   4.  She was on insulin.  The patient was on Humalog insulin, before lunch 10   units and 10 units before dinner and she was on 20 units of Lantus at bedtime.     ALLERGIES:  No known drug allergies.     OBSTETRICAL HISTORY:    1.  She is a  3, para 1.  On 10/16/2017, she had a female, normal   spontaneous vaginal delivery.  The patient did have gestational diabetes and   gestational hypertension.  2.  Previous SAB in the first trimester.     GYNECOLOGIC HISTORY:  The patient started menstruating at age 13, has   menstrual cycles every 28 days, last about five days.  Her last menstrual   cycle was on 10/18/2018.  Her last Pap smear was on 03/10/2020, negative Pap,   negative HPV, negative chlamydia, negative gonorrhea.     SOCIAL HISTORY:  The patient is .  She denies tobacco, alcohol, or drug   use.  She is a former smoker,  but quit about five years ago.     FAMILY HISTORY:  Noncontributory.     PHYSICAL EXAMINATION:  VITAL SIGNS:  Stable.  She is afebrile.  GENERAL:  Pleasant female in no acute distress.  LUNGS:  Clear to auscultation bilaterally.  HEART:  Regular rate and rhythm, no murmur.  ABDOMEN:  Soft, gravid.  Leopold's 7 pounds.  EXTREMITIES:  No calf tenderness.  PELVIC:  Fetal heart tones 130s, category 1.  [place], mary beth irregularly.    Sterile vaginal exam, 2 cm dilated, 50% effaced, -3 station, vertex   presentation.     PRENATAL LABORATORY DATA:  She is group B strep positive.  H and H 13.9 and   41.5, platelets are 187,000.  RPR nonreactive.  O positive.  One-hour glucose   199.  The patient's MSAFP was negative.  Group B strep positive.  HIV   nonreactive.  Hepatitis C is negative.  Hepatitis B surface antigen negative.     ASSESSMENT AND PLAN:  A 40-year-old  3, para 1 at 38 weeks and 1 day.  1.  Induction of labor, the patient with a favorable cervix.  She was started   on Pitocin, and will expect a normal spontaneous vaginal delivery.  2.  A2 diabetes, on insulin, the patient with stable blood sugars.  We will   check blood sugars every four hours in latent labor and every two in active   labor.  3.  IVF pregnancy.  The patient had a comprehensive normal ultrasound with   [facility] and she also had a negative MSAFP.  4.  GBS positive.  The patient was started on penicillin G.  6.  Advanced maternal age.  7.  Fetal status is reassuring.        ______________________________  MD HILLARY SHERWOOD/HOLDEN/STEPHANIE    DD:  03/15/2021 17:56  DT:  03/15/2021 18:25    Job#:  [id number]    CC:DARCI RAMIREZ MD

## 2021-03-16 NOTE — DISCHARGE SUMMARY
Discharge Summary:      Tiffanie Shaikh    Admit Date:   3/15/2021  Discharge Date:  3/17/2021     Admitting diagnosis:  Normal labor [O80, Z37.9]  Labor abnormal [O62.9]  Discharge Diagnosis: Status post vaginal, spontaneous.  Pregnancy Complications: group B strep (treated)  Tubal Ligation:  no        History:  History reviewed. No pertinent past medical history.  OB History    Para Term  AB Living   3 2 1   1 1   SAB TAB Ectopic Molar Multiple Live Births           0 1      # Outcome Date GA Lbr Jesus/2nd Weight Sex Delivery Anes PTL Lv   3 Term 03/15/21 38w1d / 00:54 2.86 kg (6 lb 4.9 oz) M Vag-Spont EPI N KEMAL   2 AB            1 Para                 Patient has no known allergies.  There are no problems to display for this patient.       Hospital Course:   40 y.o. , now para 2, was admitted with the above mentioned diagnosis, underwent Induction of Labor, pt progressed to complete and had a  vaginal, spontaneous delivery. Patient postpartum course was unremarkable, with progressive advancement in diet , ambulation and toleration of oral analgesia. Patient without complaints today and desires discharge.      Vitals:    03/15/21 1922 03/15/21 2100 21 0200 21 0600   BP: 116/59 132/73 115/71 117/69   Pulse: 90 92 81 76   Resp:  18 16 16   Temp:  36.7 °C (98.1 °F) 36.6 °C (97.8 °F) 36.7 °C (98 °F)   TempSrc:  Temporal Temporal Temporal   SpO2:  95% 94% 94%   Weight:       Height:           Current Facility-Administered Medications   Medication Dose   • ondansetron (ZOFRAN ODT) dispertab 4 mg  4 mg    Or   • ondansetron (ZOFRAN) syringe/vial injection 4 mg  4 mg   • oxytocin (PITOCIN) infusion (for induction)  0.5-20 tawny-units/min   • oxytocin (PITOCIN) infusion (for postpartum)  2,000 mL/hr    Followed by   • oxytocin (PITOCIN) infusion (for postpartum)   mL/hr   • ibuprofen (MOTRIN) tablet 600 mg  600 mg   • oxyCODONE-acetaminophen (PERCOCET) 5-325 MG per tablet 1 tablet  1  tablet   • HYDROcodone/acetaminophen (NORCO)  MG per tablet 1 tablet  1 tablet   • ropivacaine 0.2 % (NAROPIN) injection     • LR infusion     • tetanus-dipth-acell pertussis (Tdap) inj 0.5 mL  0.5 mL   • measles, mumps and rubella vaccine (MMR) injection 0.5 mL  0.5 mL   • PRN oxytocin (PITOCIN) (20 Units/1000 mL) PRN for excessive uterine bleeding - See Admin Instr  125-999 mL/hr   • miSOPROStol (CYTOTEC) tablet 600 mcg  600 mcg   • carboPROST (HEMABATE) injection 250 mcg  250 mcg   • docusate sodium (COLACE) capsule 100 mg  100 mg   • magnesium hydroxide (MILK OF MAGNESIA) suspension 30 mL  30 mL       Exam:  Gen: NAD  Breast Exam: negative  Abdomen: Abdomen soft, non-tender. BS normal. No masses,  No organomegaly  Fundus Non Tender: no  Extremity: extremities, peripheral pulses and reflexes normal, no edema, redness or tenderness in the calves or thighs     Labs:  Recent Labs     03/15/21  0639 03/16/21  0130   WBC 9.2 12.5*   RBC 4.11* 3.87*   HEMOGLOBIN 13.9 12.9   HEMATOCRIT 40.7 38.0   MCV 99.0* 98.2*   MCH 33.8* 33.3*   MCHC 34.2 33.9   RDW 48.8 47.4   PLATELETCT 145* 124*   MPV 12.3 12.6        Activity:   Discharge to home  Pelvic Rest x 6 weeks    Assessment:  normal postpartum course  Discharge Assessment: No areas of skin breakdown/redness.     Follow up: 6 weeks with Dr Suggs     Discharge Meds:   No current outpatient medications on file.   OTC ibuprofen, tylenol, colace    Agatha Suggs M.D.      Patient discharged to home on PPD #2 on 3/17/2021.  Patient discharge held secondary to infant not having bowel movement at the time of discharge.  Infant and mother now ready for discharge.

## 2021-03-16 NOTE — CONSULTS
Met with MOB for an initial lactation visit.  MOB delivered her second baby yesterday, 03/15/21, at 1724 at 38.1 weeks gestation.  Breastfeeding risk factors are: GDM-insulin controlled, gestational HTN, infertility and advanced maternal age.  MOB reported she breast fed her first baby for 6 months.      Currently, MOB stated infant is latching onto the breast without difficulty and is feeding well.  MOB reported infant was circumcised this morning, but he was observed breastfeeding at MOB's right breast in the cross cradle position with good nutritive suck observed.  MOB denied pain with latch.  Infant was feeding at the breast when this LC walked into the room.  MOB observed to have good positioning and wedging technique. Lactation assistance was offered, but MOB declined.  MOB denied tissue damage to her breasts with latch.    Breastfeeding Plan:  Continue to offer infant the breast per feeding cues with a minimum of 8 or more breastfeeds in a 24 hour period.  MOB informed she may hand express and feed back her expressed breast milk to infant via spoon should infant be too tired to latch onto the breast up to 6-12 hours following circumcision.    Cluster feeding discussed with MOB.    MOB stated she is working on improving her hand expression technique.    Provided MOB with written and verbal information on the outpatient lactation assistance available to her through the [facility] and the [facility] [place] (via Zoom).    MOB verbalized understanding of all information provided to her and denied having any lactation questions and/or concerns at this time.  Encouraged MOB to call for lactation assistance as needed.

## 2021-03-16 NOTE — ANESTHESIA TIME REPORT
Anesthesia Start and Stop Event Times     Date Time Event    3/15/2021 1453 Ready for Procedure     1454 Anesthesia Start     1724 Anesthesia Stop        Responsible Staff  03/15/21    Name Role Begin End    Dane Diaz M.D. Anesth 1454 1724        Preop Diagnosis (Free Text):  Pre-op Diagnosis             Preop Diagnosis (Codes):    Post op Diagnosis  Pain during labor      Premium Reason  A. 3PM - 7AM    Comments:

## 2021-03-16 NOTE — PROGRESS NOTES
1855 Report from ASTRID Curiel, assumed care of pt. Pt just finished dinner, able to move legs well. Fundus firm, lochia scant. Discussed POC for moving up to postpartum. Pt declines pain medication.   1930 Pt up to bathroom with steady gait, voided, mal care done, new pads/gown.   1945 Transferred to postpartum unit in stable condition with infant and FOB. Report given to ASTRID Vizcaino. Bands/Cuddles verified.

## 2021-03-16 NOTE — ANESTHESIA POSTPROCEDURE EVALUATION
Patient: Tiffanie Shaikh    Procedure Summary     Date: 03/15/21 Room / Location:     Anesthesia Start: 1454 Anesthesia Stop: 1724    Procedure: Labor Epidural Diagnosis:     Scheduled Providers:  Responsible Provider: Dane Diaz M.D.    Anesthesia Type: epidural ASA Status: 2          Final Anesthesia Type: epidural  Last vitals  BP   Blood Pressure: 119/55    Temp   36.6 °C (97.8 °F)    Pulse   90   Resp   18    SpO2   95 %      Anesthesia Post Evaluation    Patient location during evaluation: PACU  Patient participation: complete - patient participated  Level of consciousness: awake  Pain score: 2    Airway patency: patent  Anesthetic complications: no  Cardiovascular status: adequate  Respiratory status: acceptable  Hydration status: acceptable    PONV: none          No complications documented.

## 2021-03-17 VITALS
DIASTOLIC BLOOD PRESSURE: 79 MMHG | SYSTOLIC BLOOD PRESSURE: 122 MMHG | HEIGHT: 66 IN | BODY MASS INDEX: 25.39 KG/M2 | HEART RATE: 79 BPM | TEMPERATURE: 98 F | RESPIRATION RATE: 18 BRPM | OXYGEN SATURATION: 96 % | WEIGHT: 158 LBS

## 2021-03-17 NOTE — PROGRESS NOTES
Assessment complete. Fundus firm, lochia scant. Plan of care reviewed. Pt verbalized understanding. Denies pain at this time, educated to call if pt has pain.

## 2021-03-17 NOTE — PROGRESS NOTES
Notified Dr. Flores that baby will not be discharging tonight and parents want to stay. Orders received to D/c the D/c order.

## 2021-03-17 NOTE — CARE PLAN
Problem: Potential for postpartum infection related to presence of episiotomy/vaginal tear and/or uterine contamination  Goal: Patient will be absent from signs and symptoms of infection  Outcome: PROGRESSING AS EXPECTED     Problem: Potential knowledge deficit related to lack of understanding of self and  care  Goal: Patient will verbalize understanding of self and infant care  Outcome: PROGRESSING AS EXPECTED

## 2021-03-18 NOTE — PROGRESS NOTES
Late entry     Postpartum d/c instructions for mom and baby completed, pt understands instructions and questions answered. vss

## 2021-04-07 ENCOUNTER — IMMUNIZATION (OUTPATIENT)
Dept: FAMILY PLANNING/WOMEN'S HEALTH CLINIC | Facility: IMMUNIZATION CENTER | Age: 41
End: 2021-04-07
Payer: COMMERCIAL

## 2021-04-07 DIAGNOSIS — Z23 ENCOUNTER FOR VACCINATION: Primary | ICD-10-CM

## 2021-04-07 PROCEDURE — 91300 PFIZER SARS-COV-2 VACCINE: CPT | Performed by: INTERNAL MEDICINE

## 2021-04-07 PROCEDURE — 0001A PFIZER SARS-COV-2 VACCINE: CPT | Performed by: INTERNAL MEDICINE

## 2021-04-30 ENCOUNTER — IMMUNIZATION (OUTPATIENT)
Dept: FAMILY PLANNING/WOMEN'S HEALTH CLINIC | Facility: IMMUNIZATION CENTER | Age: 41
End: 2021-04-30
Payer: COMMERCIAL

## 2021-04-30 DIAGNOSIS — Z23 ENCOUNTER FOR VACCINATION: Primary | ICD-10-CM

## 2021-04-30 PROCEDURE — 91300 PFIZER SARS-COV-2 VACCINE: CPT | Performed by: INTERNAL MEDICINE

## 2021-04-30 PROCEDURE — 0002A PFIZER SARS-COV-2 VACCINE: CPT | Performed by: INTERNAL MEDICINE
